# Patient Record
Sex: FEMALE | Race: WHITE | Employment: OTHER | URBAN - METROPOLITAN AREA
[De-identification: names, ages, dates, MRNs, and addresses within clinical notes are randomized per-mention and may not be internally consistent; named-entity substitution may affect disease eponyms.]

---

## 2017-06-29 ENCOUNTER — HOSPITAL ENCOUNTER (OUTPATIENT)
Dept: OTHER | Age: 67
Discharge: HOME | End: 2017-06-29
Attending: INTERNAL MEDICINE | Admitting: INTERNAL MEDICINE

## 2017-11-01 ENCOUNTER — HOSPITAL ENCOUNTER (OUTPATIENT)
Dept: MAMMOGRAPHY | Age: 67
Discharge: HOME | End: 2017-11-01
Admitting: OBSTETRICS & GYNECOLOGY

## 2017-11-01 DIAGNOSIS — Z12.31 VISIT FOR SCREENING MAMMOGRAM: ICD-10-CM

## 2017-12-05 ENCOUNTER — OFFICE VISIT (OUTPATIENT)
Dept: DERMATOLOGY | Age: 67
End: 2017-12-05

## 2017-12-05 DIAGNOSIS — Z12.83 SCREENING EXAM FOR SKIN CANCER: ICD-10-CM

## 2017-12-05 DIAGNOSIS — L66.1 LICHEN PLANO-PILARIS: ICD-10-CM

## 2017-12-05 DIAGNOSIS — L57.0 ACTINIC KERATOSES: Primary | ICD-10-CM

## 2017-12-05 DIAGNOSIS — L21.9 SEBORRHEIC DERMATITIS OF SCALP: ICD-10-CM

## 2017-12-05 DIAGNOSIS — L82.1 SEBORRHEIC KERATOSES: ICD-10-CM

## 2017-12-05 PROCEDURE — 99213 OFFICE O/P EST LOW 20 MIN: CPT | Performed by: DERMATOLOGY

## 2017-12-05 PROCEDURE — 3014F SCREEN MAMMO DOC REV: CPT | Performed by: DERMATOLOGY

## 2017-12-05 PROCEDURE — G8400 PT W/DXA NO RESULTS DOC: HCPCS | Performed by: DERMATOLOGY

## 2017-12-05 PROCEDURE — G8427 DOCREV CUR MEDS BY ELIG CLIN: HCPCS | Performed by: DERMATOLOGY

## 2017-12-05 PROCEDURE — G8484 FLU IMMUNIZE NO ADMIN: HCPCS | Performed by: DERMATOLOGY

## 2017-12-05 PROCEDURE — G8421 BMI NOT CALCULATED: HCPCS | Performed by: DERMATOLOGY

## 2017-12-05 PROCEDURE — 3017F COLORECTAL CA SCREEN DOC REV: CPT | Performed by: DERMATOLOGY

## 2017-12-05 PROCEDURE — 1123F ACP DISCUSS/DSCN MKR DOCD: CPT | Performed by: DERMATOLOGY

## 2017-12-05 PROCEDURE — 17000 DESTRUCT PREMALG LESION: CPT | Performed by: DERMATOLOGY

## 2017-12-05 PROCEDURE — 1036F TOBACCO NON-USER: CPT | Performed by: DERMATOLOGY

## 2017-12-05 PROCEDURE — 4040F PNEUMOC VAC/ADMIN/RCVD: CPT | Performed by: DERMATOLOGY

## 2017-12-05 PROCEDURE — 1090F PRES/ABSN URINE INCON ASSESS: CPT | Performed by: DERMATOLOGY

## 2017-12-05 RX ORDER — KETOCONAZOLE 20 MG/ML
SHAMPOO TOPICAL
Qty: 1 BOTTLE | Refills: 5 | Status: SHIPPED | OUTPATIENT
Start: 2017-12-05 | End: 2019-02-19 | Stop reason: SDUPTHER

## 2017-12-05 NOTE — PROGRESS NOTES
[facility]) Dermatology  Jacquiline Frankel, [place]      Mega Barrytigist  1950    79 y.o. female     Date of Visit: 12/5/2017    Last Visit: 2yrs    Chief Complaint: Skin check    History of Present Illness:  1. Here for skin check. Reports a rough lesion on nose   -Uses SPF 15+ sunscreen under makeup  -Does not spend much time in sun. 2. Follow-up for biopsy-confirmed LPP. Flares w/ moderate pruritus and \"bumps\" every few weeks. Uses clobetasol soln for 1 or 2 applications until symptoms are improved (but not clear). No severe flares since last visit.   -Pt complains of general pruritus and mild flaking of scalp.   -Denies new skin lesions elsewhere. 2/22/13 scalp vertex biopsy - characteristic of (early inflammatory) lichen planopilaris. 3.  concerned about a raised lesion on back     Derm history:  -Mild ET/PP rosacea -- uses finacea as spot treatment  -Recurrent folliculitis/furunculosis - mupirocin oint     Review of Systems:  Constitutional: Reports general sense of well-being. Skin: No interval severe sunburns. Allergies: Reviewed and updated. Past Medical History, Surgical History, Medications and Allergies reviewed.      Past Medical History:   Diagnosis Date    Hypothyroid     Rosacea     Screening mammogram 11/2008    Normal     Past Surgical History:   Procedure Laterality Date    COLONOSCOPY  05/2007    Polyps / q3 years    COLONOSCOPY  07/13/2016    randsom and sigmoid biopsy    OTHER SURGICAL HISTORY  11/29/2013    DILATATION AND CURETTAGE, HYSTEROSCOPY    TONSILLECTOMY         Allergies   Allergen Reactions    Combipatch [Estradiol-Norethindrone Acet]      VAGINAL BLEEDING     Outpatient Prescriptions Marked as Taking for the 12/5/17 encounter (Office Visit) with Jacquiline Frankel, MD   Medication Sig Dispense Refill    CycloSPORINE (RESTASIS OP) Apply 1 drop to eye 2 times daily      levothyroxine (SYNTHROID) 100 MCG tablet TAKE ONE TABLET BY MOUTH ONCE DAILY 30 tablet 5    fexofenadine (ALLEGRA) 180 MG tablet TAKE 1 TABLET (180 MG) BY ORAL ROUTE ONCE DAILY (Patient taking differently: TAKE 1 TABLET (180 MG) BY ORAL ROUTE ONCE DAILY PRN only per PT) 30 tablet 10    fish oil-omega-3 fatty acids 1000 MG capsule Take 2 g by mouth daily.  Multiple Vitamin (MULTIVITAMIN PO) Take  by mouth daily. Social History: Works w/  at [employer]    Physical Examination     The following were examined and determined to be normal: Psych/Neuro, Conjunctivae/eyelids, Gums/teeth/lips, Neck, Breast/axilla/chest, Abdomen, RUE, LUE, RLE, LLE, Nails/digits and Genitalia/groin/buttocks. The following were examined and determined to be abnormal: Scalp/hair, Head/face and Back. -General: Well-appearing, NAD  1. Nasal bridge 1 - ill-defined irregularly-shaped roughly-scaling thin pink macule(s)/papule(s)   2. Vertex of scalp -- approximately 7cm round erythematous patch w/i which are multiple keratotic follicular papules w/ minimal scarring alopecia  3. Mid back - well-defined \"stuck-on\" verrucous tan-brown papule     Assessment and Plan     1. Actinic keratosis(es)  -Edu re: relationship with chronic cumulative sun exposure, low premalignant potential.   -1 lesion(s) treated w/ liquid nitrogen x 2 cycles - nasal bridge. Edu re: risk of blister formation, discomfort, scar, hypopigmentation. Discussed wound care. -Reviewed sun protective behavior -- sun avoidance during the peak hours of the day, sun-protective clothing (including hat and sunglasses), sunscreen use (water resistant, broad spectrum, SPF at least 30, need for reapplication every 2 to 3 hours). 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   -Edu re: LPP is typically progressive and may lead to scarring alopecia.  If disease is not well-controlled w/ TCS, she is willing to add plaquenil or other systemic medication in the future.  -Continue clobetasol

## 2018-01-12 NOTE — TELEPHONE ENCOUNTER
From: Nemo Espinoza  Sent: 1/12/2018 11:47 AM EST  Subject: Medication Renewal Request    Meron DAN Yecenia Flores would like a refill of the following medications:  clobetasol (TEMOVATE) 0.05 % external solution Darius Benjamin MD]    Preferred pharmacy: Other - [facility]     Comment:  Will you please submit this refill to [facility] mail in pharmacy? Thank you.

## 2018-01-15 RX ORDER — CLOBETASOL PROPIONATE 0.46 MG/ML
SOLUTION TOPICAL
Qty: 50 ML | Refills: 1 | Status: SHIPPED | OUTPATIENT
Start: 2018-01-15 | End: 2020-05-04 | Stop reason: SDUPTHER

## 2018-01-15 RX ORDER — CLOBETASOL PROPIONATE 0.46 MG/ML
SOLUTION TOPICAL
Qty: 50 ML | Refills: 1 | Status: SHIPPED | OUTPATIENT
Start: 2018-01-15 | End: 2018-01-15 | Stop reason: SDUPTHER

## 2018-12-10 ENCOUNTER — HOSPITAL ENCOUNTER (OUTPATIENT)
Dept: GENERAL RADIOLOGY | Age: 68
Discharge: HOME | End: 2018-12-10
Payer: COMMERCIAL

## 2018-12-10 ENCOUNTER — HOSPITAL ENCOUNTER (OUTPATIENT)
Dept: MAMMOGRAPHY | Age: 68
Discharge: HOME | End: 2018-12-10
Payer: COMMERCIAL

## 2018-12-10 DIAGNOSIS — Z78.0 MENOPAUSE: ICD-10-CM

## 2018-12-10 DIAGNOSIS — Z12.31 SCREENING MAMMOGRAM, ENCOUNTER FOR: ICD-10-CM

## 2018-12-10 PROCEDURE — 77063 BREAST TOMOSYNTHESIS BI: CPT

## 2018-12-10 PROCEDURE — 77080 DXA BONE DENSITY AXIAL: CPT

## 2019-02-19 ENCOUNTER — OFFICE VISIT (OUTPATIENT)
Dept: DERMATOLOGY | Age: 69
End: 2019-02-19
Payer: COMMERCIAL

## 2019-02-19 DIAGNOSIS — L66.1 LICHEN PLANO-PILARIS: Primary | ICD-10-CM

## 2019-02-19 PROCEDURE — 99213 OFFICE O/P EST LOW 20 MIN: CPT | Performed by: DERMATOLOGY

## 2019-02-19 RX ORDER — KETOCONAZOLE 20 MG/ML
SHAMPOO TOPICAL
Qty: 1 BOTTLE | Refills: 11 | Status: SHIPPED | OUTPATIENT
Start: 2019-02-19 | End: 2020-05-04 | Stop reason: SDUPTHER

## 2019-12-17 ENCOUNTER — HOSPITAL ENCOUNTER (OUTPATIENT)
Dept: MAMMOGRAPHY | Age: 69
Discharge: HOME | End: 2019-12-17
Payer: MEDICARE

## 2019-12-17 DIAGNOSIS — Z12.31 BREAST CANCER SCREENING BY MAMMOGRAM: ICD-10-CM

## 2019-12-17 PROCEDURE — 77067 SCR MAMMO BI INCL CAD: CPT

## 2020-04-28 RX ORDER — KETOCONAZOLE 20 MG/ML
SHAMPOO TOPICAL
Qty: 120 ML | OUTPATIENT
Start: 2020-04-28

## 2020-04-29 NOTE — TELEPHONE ENCOUNTER
Pt calling back states she only need refill on a shampoo for her scalp pls return patient call back @ [phone] to discuss

## 2020-05-04 ENCOUNTER — VIRTUAL VISIT (OUTPATIENT)
Dept: DERMATOLOGY | Age: 70
End: 2020-05-04
Payer: MEDICARE

## 2020-05-04 PROCEDURE — 99214 OFFICE O/P EST MOD 30 MIN: CPT | Performed by: DERMATOLOGY

## 2020-05-04 RX ORDER — METRONIDAZOLE 10 MG/G
GEL TOPICAL
Qty: 60 G | Refills: 3 | Status: SHIPPED | OUTPATIENT
Start: 2020-05-04

## 2020-05-04 RX ORDER — CLINDAMYCIN PHOSPHATE 11.9 MG/ML
SOLUTION TOPICAL PRN
COMMUNITY

## 2020-05-04 RX ORDER — KETOCONAZOLE 20 MG/ML
SHAMPOO TOPICAL
Qty: 1 BOTTLE | Refills: 11 | Status: SHIPPED | OUTPATIENT
Start: 2020-05-04

## 2020-05-04 RX ORDER — CLOBETASOL PROPIONATE 0.46 MG/ML
SOLUTION TOPICAL
Qty: 50 ML | Refills: 1 | Status: SHIPPED | OUTPATIENT
Start: 2020-05-04

## 2020-05-04 NOTE — PROGRESS NOTES
TELEHEALTH EVALUATION -- Audio/Visual (During [id number]-[id number] public health emergency)       Madhuri Ivory  1950    79 y.o. female     Date of Visit: 5/4/2020    Due to the COVID-19 restrictions on close contact interactions, this visit was conducted via telemedicine using doxy. me in lieu of a face-to-face visit. Last visit: 1yr    Chief Complaint: Scalp issues    History of Present Illness:  1. Follow-up for biopsy-confirmed LPP of scalp. Overall improved. Still tends to flare every 1-2 weeks. Uses clobetasol soln for 1 or 2 days straight w/ improvement of symptoms. No severe flares since last visit.   -No lesions today   -Has not noticed any scalp hair loss   -Denies new skin lesions elsewhere. 2/22/13 scalp vertex biopsy - characteristic of (early inflammatory) lichen planopilaris. 2. F/u mild scalp seborrheic dermatitis. Well-controlled w/ ketoconazole shampoo, used biw. Reports that she seems to be sweating more in the past few months and with this, has been experiencing more frequent moderate pruritus of scalp. Denies flaking. 3. F/u mild ET/PP rosacea. Prior treatment - clindamycin soln many years ago. Could not afford finacea. Reports continued redness of cheeks and nose, as well as occasional pimple-type lesions (every month or so). 4. Concerned about a stable raised asymptomatic lesion on L elbow. Derm history:  -Recurrent folliculitis/furunculosis - mupirocin oint   -History of actinic keratoses s/p cryotherapy - last FSE 12/2017    Review of Systems:  Gen: Feels well, good sense of health. Allergies: Reviewed and updated  Skin: No new or changing moles     Past Medical History, Family History, Surgical History, Medications and Allergies reviewed.     Past Medical History:   Diagnosis Date    Hypothyroid     Rosacea     Screening mammogram 11/2008    Normal     Past Surgical History:   Procedure Laterality Date    COLONOSCOPY  05/2007    Polyps / q3 years    COLONOSCOPY

## 2021-06-28 ENCOUNTER — HOSPITAL ENCOUNTER (OUTPATIENT)
Dept: MAMMOGRAPHY | Age: 71
Discharge: HOME | End: 2021-06-28
Payer: MEDICARE

## 2021-06-28 DIAGNOSIS — Z12.31 BREAST CANCER SCREENING BY MAMMOGRAM: ICD-10-CM

## 2021-06-28 PROCEDURE — 77063 BREAST TOMOSYNTHESIS BI: CPT

## 2024-03-14 ENCOUNTER — HOSPITAL ENCOUNTER (OUTPATIENT)
Dept: MAMMOGRAPHY | Age: 74
Discharge: HOME | End: 2024-03-14
Payer: MEDICARE

## 2024-03-14 VITALS — BODY MASS INDEX: 22.6 KG/M2 | WEIGHT: 144 LBS | HEIGHT: 67 IN

## 2024-03-14 DIAGNOSIS — Z12.31 SCREENING MAMMOGRAM FOR BREAST CANCER: ICD-10-CM

## 2024-03-14 PROCEDURE — 77063 BREAST TOMOSYNTHESIS BI: CPT

## 2025-04-15 ENCOUNTER — HOSPITAL ENCOUNTER (OUTPATIENT)
Dept: WOMENS IMAGING | Age: 75
Discharge: HOME | End: 2025-04-15
Payer: MEDICARE

## 2025-04-15 VITALS — HEIGHT: 67 IN | BODY MASS INDEX: 22.76 KG/M2 | WEIGHT: 145 LBS

## 2025-04-15 DIAGNOSIS — Z12.31 SCREENING MAMMOGRAM FOR BREAST CANCER: ICD-10-CM

## 2025-04-15 PROCEDURE — 77063 BREAST TOMOSYNTHESIS BI: CPT
